# Patient Record
Sex: MALE | Race: WHITE | NOT HISPANIC OR LATINO
[De-identification: names, ages, dates, MRNs, and addresses within clinical notes are randomized per-mention and may not be internally consistent; named-entity substitution may affect disease eponyms.]

---

## 2021-02-19 DIAGNOSIS — N46.9 MALE INFERTILITY, UNSPECIFIED: Primary | ICD-10-CM

## 2021-02-24 DIAGNOSIS — N46.9 MALE INFERTILITY, UNSPECIFIED: Primary | ICD-10-CM

## 2021-02-24 DIAGNOSIS — N46.9 MALE INFERTILITY, UNSPECIFIED: ICD-10-CM

## 2021-02-24 LAB
ABSTINENCE DAYS: 1 DAYS (ref 2–7)
AGGLUTINATION: NO YES/NO
ANALYSIS TEMP - CENTIGRADE: 24 CENTIGRADE
CELL FRAGMENTS: ABNORMAL %
COLLECTION METHOD: ABNORMAL
COLLECTION SITE: ABNORMAL
CONSENT TO RELEASE TO PARTNER: YES
IMMATURE SPERM: ABNORMAL %
IMMOTILE: 87 %
LAB RECEIPT TIME: ABNORMAL
LIQUEFIED: YES YES/NO
NON-PROGRESSIVE MOTILITY: 4 %
PROGRESSIVE MOTILITY: 9 % (ref 32–?)
ROUND CELLS: 0 MILLION/ML (ref ?–2)
SPECIMEN CONCENTRATION: 0.1 MILLION/ML (ref 15–?)
SPECIMEN PH: 7.6 PH (ref 7.2–?)
SPECIMEN TYPE: ABNORMAL
SPECIMEN VOL UR: 2.7 ML (ref 1.5–?)
TIME OF ANALYSIS: ABNORMAL
TOTAL NUMBER: 0.3 MILLION (ref 39–?)
TOTAL PROGRESSIVE MOTILE: 0.03 MILLION (ref 15.6–?)
VISCOUS: NO YES/NO
VITALITY: ABNORMAL % (ref 58–?)
WBC SPECIMEN: ABNORMAL %

## 2021-02-24 PROCEDURE — 89320 SEMEN ANAL VOL/COUNT/MOT: CPT

## 2021-03-03 DIAGNOSIS — N46.9 MALE INFERTILITY, UNSPECIFIED: Primary | ICD-10-CM

## 2021-03-24 DIAGNOSIS — N46.9 MALE INFERTILITY, UNSPECIFIED: Primary | ICD-10-CM

## 2021-03-24 LAB
ABSTINENCE DAYS: 4 DAYS (ref 2–7)
AGGLUTINATION: NO YES/NO
ANALYSIS TEMP - CENTIGRADE: 23.5 CENTIGRADE
CELL FRAGMENTS: ABNORMAL %
COLLECTION METHOD: ABNORMAL
COLLECTION SITE: ABNORMAL
CONSENT TO RELEASE TO PARTNER: YES
IMMATURE SPERM: ABNORMAL %
IMMOTILE: 85 %
LAB RECEIPT TIME: ABNORMAL
LIQUEFIED: YES YES/NO
NON-PROGRESSIVE MOTILITY: 6 %
PROGRESSIVE MOTILITY: 9 % (ref 32–?)
ROUND CELLS: 0 MILLION/ML (ref ?–2)
SPECIMEN CONCENTRATION: 0.1 MILLION/ML (ref 15–?)
SPECIMEN PH: 7.6 PH (ref 7.2–?)
SPECIMEN TYPE: ABNORMAL
SPECIMEN VOL UR: 6.1 ML (ref 1.5–?)
TIME OF ANALYSIS: ABNORMAL
TOTAL NUMBER: 0.6 MILLION (ref 39–?)
TOTAL PROGRESSIVE MOTILE: 0.05 MILLION (ref 15.6–?)
VISCOUS: NO YES/NO
VITALITY: ABNORMAL % (ref 58–?)
WBC SPECIMEN: ABNORMAL %

## 2021-03-24 PROCEDURE — 89320 SEMEN ANAL VOL/COUNT/MOT: CPT

## 2025-04-05 ENCOUNTER — HEALTH MAINTENANCE LETTER (OUTPATIENT)
Age: 40
End: 2025-04-05

## 2025-06-22 NOTE — PROGRESS NOTES
Onset: just now    Location / description: pt states, \"I need to get to the hospital because I cannot breathe. My weight is up 10#.  States he woke up and is gasping for air. Pt upset and crying. Sounds SOB. Advised to hang up now and call 911. Pt agreeable to plan.     Precipitating Factors: na    Pain Scale (1-10), 10 highest: na/10    Associated Symptoms: increased weight    What  improves / worsens symptoms: na    Symptom specific medications: see list    Recent visits (last 3-4 weeks) for same reason or recent surgery:  10-   RN    PLAN:  Call 911    Patient/Caller agrees to follow recommendations.    Reason for Disposition  • SEVERE difficulty breathing (e.g., struggling for each breath, speaks in single words)    Protocols used: BREATHING DIFFICULTY-A-AH       [facility] Dermatology Note  Encounter Date: Jun 23, 2025  Office Visit     Dermatology Problem List:  1. Seborrheic dermatitis  - current tx: ketoconazole 2% shampoo, ketoconazole 2% cream      Social: grew up in [place]    ____________________________________________    Assessment & Plan:    .# multiple benign nevi (back and lower legs)    - ABCDEs: Counseled ABCDEs of melanoma: Asymmetry, Border (irregularity), Color (not uniform, changes in color), Diameter (greater than 6 mm which is about the size of a pencil eraser), and Evolving (any changes in preexisting moles).  - Sun protection: Counseled SPF30+ sunscreen, UPF clothing, sun avoidance, tanning bed avoidance.    # Cherry angiomas on the face and scalp.   Benign, reassurance given.    - discussed cosmetic removal of cherry angiomas in detail (laser treatment).  - Referral for cosmetic laser treatment    # Seborrheic dermatitis on the face scalp and beard area,   - start ketoconazole 2% shampoo- use in the shower on the scalp and beard area, leave on for 5 minutes then lather and rinse  - start ketoconazole 2% cream-apply to affected areas once daily on the face.       Procedures Performed:   None      Follow-up: 6 month(s) in-person, or earlier for new or changing lesions (seb.derm)    Staff and Scribe:     Scribe Disclosure:   By signing my name below, I, Celestina Ding, attest that this document has been prepared under the direction and in the presence of Naima Pierre PA-C      Electronically signed by: Celestina Ding 6/23/25    All risks, benefits and alternatives were discussed with patient.  Patient is in agreement and understands the assessment and plan.  All questions were answered.  Sun Screen Education was given.   Return to Clinic in 6 months or sooner as needed.   Naima Pierre PA-C    ____________________________________________    CC: Skin Check (Patient is here today for a spot of concern on forehead and skin  exam. )    HPI:  Mr. Nito Luna is a(n) 40 year old male who presents today as a new patient for a few concerns.       Patient here for skin check on his back and scalp. Today, has spot of concern on the forehead, present for years. He will get dry patches in the beard area. Has tried sal acid shampoo, but nothing has been very helpful. Reports, a growth on the side of his head, that will get irritated when he shaves his scalp.     Patient is otherwise feeling well, without additional skin concerns.    Labs Reviewed:  N/A    Physical Exam:  Vitals: There were no vitals taken for this visit.  SKIN: Focused examination of the head, neck, back and lower legs was performed.  - There is macular erythema of the scalp, beard area and cheeks with mild flaky white scale.  - multiple brown round symmetric macules on the back and posterior lower legs.   - few round erythematous papules on the face and scalp  - No other lesions of concern on areas examined.     Medications:  No current outpatient medications on file.     No current facility-administered medications for this visit.      Past Medical History:   There is no problem list on file for this patient.    No past medical history on file.     CC Referred Self, MD  No address on file on close of this encounter.

## 2025-06-23 ENCOUNTER — OFFICE VISIT (OUTPATIENT)
Dept: DERMATOLOGY | Facility: CLINIC | Age: 40
End: 2025-06-23
Payer: COMMERCIAL

## 2025-06-23 DIAGNOSIS — D18.01 CHERRY ANGIOMA: ICD-10-CM

## 2025-06-23 DIAGNOSIS — D22.9 MULTIPLE PIGMENTED NEVI: ICD-10-CM

## 2025-06-23 DIAGNOSIS — L21.9 DERMATITIS, SEBORRHEIC: Primary | ICD-10-CM

## 2025-06-23 RX ORDER — KETOCONAZOLE 20 MG/G
CREAM TOPICAL DAILY
Qty: 60 G | Refills: 11 | Status: SHIPPED | OUTPATIENT
Start: 2025-06-23

## 2025-06-23 RX ORDER — KETOCONAZOLE 20 MG/ML
SHAMPOO, SUSPENSION TOPICAL
Qty: 120 ML | Refills: 11 | Status: SHIPPED | OUTPATIENT
Start: 2025-06-23

## 2025-06-23 ASSESSMENT — PAIN SCALES - GENERAL: PAINLEVEL_OUTOF10: NO PAIN (0)

## 2025-06-23 NOTE — PATIENT INSTRUCTIONS

## 2025-06-23 NOTE — NURSING NOTE
Chief Complaint   Patient presents with    Skin Check     Patient is here today for a spot of concern on forehead and skin exam.      6/23/2025  Blanca BELL CMA

## 2025-06-23 NOTE — LETTER
6/23/2025       RE: Nito FIERRO Cheryl  [address] [facility] [address]     Dear Colleague,    Thank you for referring your patient, Nito Luna, to the [facility] [place] at [facility]. Please see a copy of my visit note below.    [facility] Dermatology Note  Encounter Date: Jun 23, 2025  Office Visit     Dermatology Problem List:  1. Seborrheic dermatitis  - current tx: ketoconazole 2% shampoo, ketoconazole 2% cream      Social: grew up in [place]    ____________________________________________    Assessment & Plan:    .# multiple benign nevi (back and lower legs)    - ABCDEs: Counseled ABCDEs of melanoma: Asymmetry, Border (irregularity), Color (not uniform, changes in color), Diameter (greater than 6 mm which is about the size of a pencil eraser), and Evolving (any changes in preexisting moles).  - Sun protection: Counseled SPF30+ sunscreen, UPF clothing, sun avoidance, tanning bed avoidance.    # Cherry angiomas on the face and scalp.   Benign, reassurance given.    - discussed cosmetic removal of cherry angiomas in detail (laser treatment).  - Referral for cosmetic laser treatment    # Seborrheic dermatitis on the face scalp and beard area,   - start ketoconazole 2% shampoo- use in the shower on the scalp and beard area, leave on for 5 minutes then lather and rinse  - start ketoconazole 2% cream-apply to affected areas once daily on the face.       Procedures Performed:   None      Follow-up: 6 month(s) in-person, or earlier for new or changing lesions (seb.derm)    Staff and Scribe:     Scribe Disclosure:   By signing my name below, I, Celestina Ding, attest that this document has been prepared under the direction and in the presence of Naima Pierre PA-C      Electronically signed by: Celestina Ding 6/23/25    All risks, benefits and alternatives were discussed with patient.  Patient is in agreement  and understands the assessment and plan.  All questions were answered.  Sun Screen Education was given.   Return to Clinic in 6 months or sooner as needed.   Naima Pierre PA-C    ____________________________________________    CC: Skin Check (Patient is here today for a spot of concern on forehead and skin exam. )    HPI:  Mr. Nito Luna is a(n) 40 year old male who presents today as a new patient for a few concerns.       Patient here for skin check on his back and scalp. Today, has spot of concern on the forehead, present for years. He will get dry patches in the beard area. Has tried sal acid shampoo, but nothing has been very helpful. Reports, a growth on the side of his head, that will get irritated when he shaves his scalp.     Patient is otherwise feeling well, without additional skin concerns.    Labs Reviewed:  N/A    Physical Exam:  Vitals: There were no vitals taken for this visit.  SKIN: Focused examination of the head, neck, back and lower legs was performed.  - There is macular erythema of the scalp, beard area and cheeks with mild flaky white scale.  - multiple brown round symmetric macules on the back and posterior lower legs.   - few round erythematous papules on the face and scalp  - No other lesions of concern on areas examined.     Medications:  No current outpatient medications on file.     No current facility-administered medications for this visit.      Past Medical History:   There is no problem list on file for this patient.    No past medical history on file.     CC Referred Self, MD  No address on file on close of this encounter.     Again, thank you for allowing me to participate in the care of your patient.      Sincerely,    Naima Pierre PA-C

## 2025-06-24 ENCOUNTER — PATIENT OUTREACH (OUTPATIENT)
Dept: CARE COORDINATION | Facility: CLINIC | Age: 40
End: 2025-06-24
Payer: COMMERCIAL

## 2025-06-26 ENCOUNTER — PATIENT OUTREACH (OUTPATIENT)
Dept: CARE COORDINATION | Facility: CLINIC | Age: 40
End: 2025-06-26
Payer: COMMERCIAL

## 2025-06-30 ENCOUNTER — TELEPHONE (OUTPATIENT)
Dept: DERMATOLOGY | Facility: CLINIC | Age: 40
End: 2025-06-30
Payer: COMMERCIAL

## 2025-06-30 NOTE — TELEPHONE ENCOUNTER
6/30 Left Voicemail (1st Attempt) and Sent Mychart (1st Attempt) for the patient to call back and schedule the following:    Appointment type: Return Dermatology  Provider: Gabby  Return date: 12/23/2025  Specialty phone number: [phone]  Additional appointment(s) needed: na  Additonal Notes: na